# Patient Record
Sex: MALE | Race: WHITE | Employment: OTHER | URBAN - NONMETROPOLITAN AREA
[De-identification: names, ages, dates, MRNs, and addresses within clinical notes are randomized per-mention and may not be internally consistent; named-entity substitution may affect disease eponyms.]

---

## 2022-04-24 ENCOUNTER — HOSPITAL ENCOUNTER (EMERGENCY)
Age: 22
Discharge: HOME | End: 2022-04-24
Attending: EMERGENCY MEDICINE
Payer: OTHER GOVERNMENT

## 2022-04-24 VITALS
TEMPERATURE: 99.1 F | OXYGEN SATURATION: 97 % | SYSTOLIC BLOOD PRESSURE: 131 MMHG | HEART RATE: 105 BPM | WEIGHT: 210 LBS | RESPIRATION RATE: 16 BRPM | DIASTOLIC BLOOD PRESSURE: 83 MMHG

## 2022-04-24 DIAGNOSIS — J02.9 VIRAL PHARYNGITIS: Primary | ICD-10-CM

## 2022-04-24 LAB
FLU A ANTIGEN: NEGATIVE
FLU B ANTIGEN: NEGATIVE
GROUP A STREP CULTURE, REFLEX: NEGATIVE
REFLEX THROAT C + S: NORMAL

## 2022-04-24 PROCEDURE — 99283 EMERGENCY DEPT VISIT LOW MDM: CPT

## 2022-04-24 PROCEDURE — 87070 CULTURE OTHR SPECIMN AEROBIC: CPT

## 2022-04-24 PROCEDURE — 87804 INFLUENZA ASSAY W/OPTIC: CPT

## 2022-04-24 PROCEDURE — 87880 STREP A ASSAY W/OPTIC: CPT

## 2022-04-24 PROCEDURE — 87077 CULTURE AEROBIC IDENTIFY: CPT

## 2022-04-24 ASSESSMENT — ENCOUNTER SYMPTOMS
SORE THROAT: 1
TROUBLE SWALLOWING: 1
DIARRHEA: 0
CONSTIPATION: 0
PHOTOPHOBIA: 0
NAUSEA: 0
CHEST TIGHTNESS: 0
SINUS PRESSURE: 0
VOMITING: 0
SINUS PAIN: 0
SHORTNESS OF BREATH: 0
ABDOMINAL PAIN: 0
WHEEZING: 0
COUGH: 0

## 2022-04-24 ASSESSMENT — PAIN DESCRIPTION - LOCATION: LOCATION: THROAT

## 2022-04-24 ASSESSMENT — PAIN SCALES - GENERAL: PAINLEVEL_OUTOF10: 5

## 2022-04-24 ASSESSMENT — PAIN DESCRIPTION - DESCRIPTORS: DESCRIPTORS: SORE

## 2022-04-24 ASSESSMENT — PAIN - FUNCTIONAL ASSESSMENT: PAIN_FUNCTIONAL_ASSESSMENT: 0-10

## 2022-04-24 NOTE — ED NOTES
Pt presents ambulatory with c/o R sided throat pain. Pt reports waking up with swollen and tender to touch on the R side of neck. Reports taking prescribed medications and IBUprofen with no relief. Swabbed for flu and strep.      Genaro Kramer RN  04/24/22 [id number]

## 2022-04-24 NOTE — ED PROVIDER NOTES
[facility] ENCOUNTER          Pt Name: Aditi Pinzon  MRN: [id number]  [place] 2000  Date of evaluation: 4/24/2022  Treating Resident Physician: Rozina Aguilar DO  Supervising Physician: Dr. Erin Bunch       Chief Complaint   Patient presents with    Pharyngitis     History obtained from the patient. HISTORY OF PRESENT ILLNESS    HPI  Aditi Pinzon is a [age] [facility] y.o. male who presents to the emergency department for evaluation of pharyngitis. Patient reports symptoms started today. He noted right-sided tenderness and swelling in the tonsillar region. He locates the pain at the angle of the right mandible. The pain is described as sharp and stabbing. This is associated with pain with swallowing and speech. He denies any associated fevers, chills, or vomiting, hearing loss or changes in vision. He denies any sick contacts or changes in diet. The patient has no other acute complaints at this time. REVIEW OF SYSTEMS   Review of Systems   Constitutional: Positive for appetite change. Negative for activity change, chills, fatigue and fever. HENT: Positive for sore throat, tinnitus (chronic) and trouble swallowing. Negative for congestion, sinus pressure and sinus pain. Eyes: Negative for photophobia and visual disturbance. Respiratory: Negative for cough, chest tightness, shortness of breath and wheezing. Cardiovascular: Negative. Gastrointestinal: Negative for abdominal pain, constipation, diarrhea, nausea and vomiting. Endocrine: Negative for polyuria. Genitourinary: Negative for difficulty urinating, dysuria, flank pain and hematuria. Musculoskeletal: Negative. Skin: Negative. Neurological: Negative for dizziness, weakness, light-headedness, numbness and headaches. Psychiatric/Behavioral: Negative for agitation, behavioral problems and confusion.          PAST MEDICAL AND SURGICAL HISTORY   No past medical history on file. No past surgical history on file. MEDICATIONS   No current facility-administered medications for this encounter. No current outpatient medications on file. SOCIAL HISTORY     Social History     Social History Narrative    Not on file     Social History     Tobacco Use    Smoking status: Not on file    Smokeless tobacco: Not on file   Substance Use Topics    Alcohol use: Not on file    Drug use: Not on file         ALLERGIES   No Known Allergies      FAMILY HISTORY   No family history on file. PREVIOUS RECORDS   Previous records reviewed: This is this patient's first visit to [facility] ED, no previous records available on EMR. .        PHYSICAL EXAM     ED Triage Vitals   BP Temp Temp src Pulse Resp SpO2 Height Weight   -- -- -- -- -- -- -- --     Initial vital signs and nursing assessment reviewed and abnormal from tachycardia. There is no height or weight on file to calculate BMI. Pulsoximetry is normal per my interpretation. Additional Vital Signs:  Vitals:    04/24/22 1032   BP: 131/83   Pulse: 105   Resp: 16   Temp: 99.1 °F (37.3 °C)   SpO2: 97%       Physical Exam  Constitutional:       General: He is in acute distress. Appearance: He is well-developed and normal weight. He is not ill-appearing. HENT:      Head: Normocephalic and atraumatic. Right Ear: Tympanic membrane and ear canal normal. No drainage, swelling or tenderness. No middle ear effusion. Tympanic membrane is not erythematous. Left Ear: Tympanic membrane and ear canal normal. No drainage, swelling or tenderness. No middle ear effusion. Tympanic membrane is not erythematous. Nose: No congestion or rhinorrhea. Mouth/Throat:      Mouth: Mucous membranes are moist. No oral lesions. Pharynx: Oropharynx is clear. Uvula midline. Posterior oropharyngeal erythema and uvula swelling present. No pharyngeal swelling or oropharyngeal exudate.       Tonsils: No tonsillar exudate or tonsillar abscesses. Eyes:      Extraocular Movements:      Right eye: Normal extraocular motion. Left eye: Normal extraocular motion. Conjunctiva/sclera: Conjunctivae normal.      Pupils: Pupils are equal, round, and reactive to light. Neck:      Thyroid: No thyromegaly. Cardiovascular:      Rate and Rhythm: Tachycardia present. Heart sounds: Normal heart sounds. No murmur heard. No friction rub. No gallop. Pulmonary:      Effort: Pulmonary effort is normal.      Breath sounds: Normal breath sounds. No wheezing, rhonchi or rales. Abdominal:      General: Bowel sounds are normal.      Palpations: Abdomen is soft. Tenderness: There is no abdominal tenderness. There is no rebound. Musculoskeletal:      Cervical back: Normal range of motion and neck supple. Lymphadenopathy:      Cervical: No cervical adenopathy. Skin:     General: Skin is warm and dry. Capillary Refill: Capillary refill takes less than 2 seconds. Neurological:      General: No focal deficit present. Mental Status: He is alert and oriented to person, place, and time. Psychiatric:         Mood and Affect: Mood normal.         Behavior: Behavior normal.             MEDICAL DECISION MAKING   Initial Assessment: 25-year-old male presents for right-sided throat pain. Onset of symptoms today. Reports right-sided tonsillar pain, difficulty with swallowing and speech. Denies difficulty breathing. Denies nausea, vomiting or fevers, chills, hearing loss, change in vision at home. Endorses chronic tinnitus. Denies any sick contacts or change in diet. Differential Dx includes:  1. Pharyngitis. 2. Uvulitis   3. Peritonsillar abscess  4. Ludewig's angina   5. Sialadenitis  Plan:    Check group A strep.    Check rapid influenza A/B        ED RESULTS   Laboratory results:  Labs Reviewed   RAPID INFLUENZA A/B ANTIGENS   CULTURE, THROAT    Narrative:     Source: Specimen not received       Site: Current Antibiotics:   GROUP A STREP, REFLEX       Radiologic studies results:  No orders to display       ED Medications administered this visit: Medications - No data to display      ED COURSE      In the ED, vitals T-max 99.1, RR 16, , /83, SPO2 97% RA. Influenza A/B negatve, group A strep negative. On presentation mild acute distress. On examination oral erythema noted in the posterior neck with some inflammation of the uvula. No swelling or exudates noted in the tonsillar region. Mentation normal.  Pinpoint tenderness elicited at the right angle of the mandible. Bilateral ear canal patent, tympanic membrane gray and reflective to light. Discussed results with patient. Advised symptomatic care with Tylenol, Chloraseptic spray and encouraged fluids. Instructed to return to the ED if symptoms continue or worsen. Strict return precautions and follow up instructions were discussed with the patient prior to discharge, with which the patient agrees. MEDICATION CHANGES     New Prescriptions    No medications on file         FINAL DISPOSITION     Final diagnoses:   Viral pharyngitis     Condition: condition: fair  Dispo: Discharge to home      This transcription was electronically signed. Parts of this transcriptions may have been dictated by use of voice recognition software and electronically transcribed, and parts may have been transcribed with the assistance of an ED scribe. The transcription may contain errors not detected in proofreading. Please refer to my supervising physician's documentation if my documentation differs.     Electronically Signed: Charlotte Shah DO, 04/24/22, 11:34 AM       Charlotte Shah DO  Resident  04/24/22 [id number]

## 2022-04-24 NOTE — ED PROVIDER NOTES

## 2022-04-29 LAB
ORGANISM: ABNORMAL
THROAT/NOSE CULTURE: ABNORMAL

## 2022-07-11 ENCOUNTER — HOSPITAL ENCOUNTER (EMERGENCY)
Age: 22
Discharge: HOME | End: 2022-07-11
Payer: OTHER GOVERNMENT

## 2022-07-11 VITALS
BODY MASS INDEX: 32.9 KG/M2 | WEIGHT: 235 LBS | OXYGEN SATURATION: 97 % | HEIGHT: 71 IN | DIASTOLIC BLOOD PRESSURE: 82 MMHG | TEMPERATURE: 98.2 F | SYSTOLIC BLOOD PRESSURE: 136 MMHG | HEART RATE: 91 BPM

## 2022-07-11 DIAGNOSIS — J02.9 VIRAL PHARYNGITIS: Primary | ICD-10-CM

## 2022-07-11 LAB
GROUP A STREP CULTURE, REFLEX: NEGATIVE
REFLEX THROAT C + S: NORMAL

## 2022-07-11 PROCEDURE — 99213 OFFICE O/P EST LOW 20 MIN: CPT

## 2022-07-11 PROCEDURE — 87077 CULTURE AEROBIC IDENTIFY: CPT

## 2022-07-11 PROCEDURE — 87070 CULTURE OTHR SPECIMN AEROBIC: CPT

## 2022-07-11 PROCEDURE — 87880 STREP A ASSAY W/OPTIC: CPT

## 2022-07-11 PROCEDURE — 99213 OFFICE O/P EST LOW 20 MIN: CPT | Performed by: EMERGENCY MEDICINE

## 2022-07-11 RX ORDER — IBUPROFEN 200 MG
200 TABLET ORAL EVERY 6 HOURS PRN
COMMUNITY

## 2022-07-11 RX ORDER — CYCLOBENZAPRINE HCL 10 MG
10 TABLET ORAL 3 TIMES DAILY PRN
COMMUNITY

## 2022-07-11 ASSESSMENT — PAIN DESCRIPTION - LOCATION: LOCATION: THROAT

## 2022-07-11 ASSESSMENT — ENCOUNTER SYMPTOMS
COUGH: 0
SORE THROAT: 1
SHORTNESS OF BREATH: 0
ABDOMINAL PAIN: 0

## 2022-07-11 ASSESSMENT — PAIN SCALES - GENERAL: PAINLEVEL_OUTOF10: 6

## 2022-07-11 ASSESSMENT — PAIN - FUNCTIONAL ASSESSMENT
PAIN_FUNCTIONAL_ASSESSMENT: 0-10
PAIN_FUNCTIONAL_ASSESSMENT: ACTIVITIES ARE NOT PREVENTED

## 2022-07-11 ASSESSMENT — PAIN DESCRIPTION - DESCRIPTORS: DESCRIPTORS: ACHING

## 2022-07-11 NOTE — Clinical Note
Ananyaemilee Gustafson was seen and treated in our emergency department on 7/11/2022. He may return to work on 07/12/2022. If you have any questions or concerns, please don't hesitate to call.       Rudi Menendez, SHAMAR - CNP

## 2022-07-11 NOTE — ED PROVIDER NOTES
[facility]  Urgent Care Encounter       CHIEF COMPLAINT       Chief Complaint   Patient presents with    Pharyngitis       Nurses Notes reviewed and I agree except as noted in the HPI. HISTORY OF PRESENT ILLNESS   Montse Kincaid is a 23 y.o. male who presents for complaints of sore throat. Symptoms have been present for 2 days. States it hurts worse on the right than does the left. Rates his pain a 6 on 10 scale. Denies cough. No fevers. No body aches or chills. No difficulty swallowing. No voice change. He has not taken anything for his symptoms. Currently rates his pain 6 on a 10 scale. HPI    REVIEW OF SYSTEMS     Review of Systems   Constitutional: Negative for chills, fatigue and fever. HENT: Positive for sore throat. Negative for congestion. Respiratory: Negative for cough and shortness of breath. Gastrointestinal: Negative for abdominal pain. Neurological: Negative for dizziness and headaches. Psychiatric/Behavioral: Negative for behavioral problems. PAST MEDICAL HISTORY         Diagnosis Date    L4 vertebral fracture ([facility] [address]        SURGICALHISTORY     Patient  has a past surgical history that includes hernia repair; [place] tooth extraction; and LASIK. CURRENT MEDICATIONS       Previous Medications    CYCLOBENZAPRINE (FLEXERIL) 10 MG TABLET    Take 10 mg by mouth 3 times daily as needed for Muscle spasms    DULOXETINE HCL (CYMBALTA PO)    Take by mouth    IBUPROFEN (ADVIL;MOTRIN) 200 MG TABLET    Take 200 mg by mouth every 6 hours as needed for Pain       ALLERGIES     Patient is has No Known Allergies. Patients   There is no immunization history on file for this patient. FAMILY HISTORY     Patient's family history is not on file. SOCIAL HISTORY     Patient  reports that he has been smoking e-cigarettes. He has never used smokeless tobacco. He reports current alcohol use. He reports that he does not use drugs.     PHYSICAL EXAM     ED TRIAGE VITALS  BP: 136/82, Temp: 98.2 °F (36.8 °C), Heart Rate: 91,  , SpO2: 97 %,Estimated body mass index is 32.78 kg/m² as calculated from the following:    Height as of this encounter: 5' 11\" (1.803 m). Weight as of this encounter: 235 lb (106.6 kg). ,No LMP for male patient. Physical Exam  Constitutional:       General: He is not in acute distress. Appearance: He is normal weight. HENT:      Head: Normocephalic. Mouth/Throat:      Mouth: Mucous membranes are moist. No oral lesions. Pharynx: Oropharynx is clear. No oropharyngeal exudate. Tonsils: No tonsillar exudate or tonsillar abscesses. 2+ on the right. 2+ on the left. Cardiovascular:      Rate and Rhythm: Normal rate and regular rhythm. Heart sounds: Normal heart sounds. Skin:     General: Skin is warm and dry. Neurological:      General: No focal deficit present. Mental Status: He is alert. DIAGNOSTIC RESULTS     Labs:  Results for orders placed or performed during the hospital encounter of 07/11/22   Strep A culture, throat    Specimen: Throat   Result Value Ref Range    REFLEX THROAT C + S INDICATED    STREP A ANTIGEN   Result Value Ref Range    GROUP A STREP CULTURE, REFLEX Negative        IMAGING:    No orders to display         EKG:      URGENT CARE COURSE:     Vitals:    07/11/22 1645   BP: 136/82   Pulse: 91   Temp: 98.2 °F (36.8 °C)   SpO2: 97%   Weight: 235 lb (106.6 kg)   Height: 5' 11\" (1.803 m)       Medications - No data to display         PROCEDURES:  None    FINAL IMPRESSION      1. Viral pharyngitis          DISPOSITION/ PLAN     Patient presents for his likely viral pharyngitis. Patient has sore throat but no other associated symptoms. Rapid strep negative. Throat culture in process. Patient be discharged and advised to perform salt water gargles, Tylenol/ibuprofen. Return for new or worsening symptoms or if symptoms do not improve in 5 to 7 days.       PATIENT REFERRED TO:  No primary care provider on file. No primary physician on file.       DISCHARGE MEDICATIONS:  New Prescriptions    No medications on file       Discontinued Medications    No medications on file       Current Discharge Medication List          Alpena Osler, APRN - CNP    (Please note that portions of this note were completed with a voice recognition program. Efforts were made to edit the dictations but occasionally words are mis-transcribed.)          Aquilla Osler, APRN - CNP  07/11/22 [id number]

## 2022-07-15 LAB
ORGANISM: ABNORMAL
THROAT/NOSE CULTURE: ABNORMAL
THROAT/NOSE CULTURE: ABNORMAL

## 2023-05-26 ENCOUNTER — HOSPITAL ENCOUNTER (OUTPATIENT)
Age: 23
Discharge: HOME | End: 2023-05-26
Payer: OTHER GOVERNMENT

## 2023-05-26 ENCOUNTER — HOSPITAL ENCOUNTER (OUTPATIENT)
Dept: GENERAL RADIOLOGY | Age: 23
Discharge: HOME | End: 2023-05-26
Payer: OTHER GOVERNMENT

## 2023-05-26 DIAGNOSIS — M25.561 RIGHT KNEE PAIN, UNSPECIFIED CHRONICITY: ICD-10-CM

## 2023-05-26 PROCEDURE — 73564 X-RAY EXAM KNEE 4 OR MORE: CPT

## 2023-08-25 ENCOUNTER — HOSPITAL ENCOUNTER (OUTPATIENT)
Dept: MRI IMAGING | Age: 23
Discharge: HOME | End: 2023-08-25
Payer: OTHER GOVERNMENT

## 2023-08-25 DIAGNOSIS — M25.561 ACUTE PAIN OF RIGHT KNEE: ICD-10-CM

## 2023-08-25 PROCEDURE — 73721 MRI JNT OF LWR EXTRE W/O DYE: CPT
